# Patient Record
Sex: FEMALE | Race: WHITE
[De-identification: names, ages, dates, MRNs, and addresses within clinical notes are randomized per-mention and may not be internally consistent; named-entity substitution may affect disease eponyms.]

---

## 2020-10-30 ENCOUNTER — HOSPITAL ENCOUNTER (EMERGENCY)
Dept: HOSPITAL 62 - ER | Age: 25
Discharge: HOME | End: 2020-10-30
Payer: COMMERCIAL

## 2020-10-30 VITALS — DIASTOLIC BLOOD PRESSURE: 79 MMHG | SYSTOLIC BLOOD PRESSURE: 129 MMHG
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
## 2020-10-30 DIAGNOSIS — S01.511A: ICD-10-CM

## 2020-10-30 DIAGNOSIS — S01.81XA: Primary | ICD-10-CM
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
## 2020-10-30 PROCEDURE — 99283 EMERGENCY DEPT VISIT LOW MDM: CPT

## 2020-10-30 PROCEDURE — 12013 RPR F/E/E/N/L/M 2.6-5.0 CM: CPT

## 2020-10-30 NOTE — ER DOCUMENT REPORT
HPI





- HPI


Patient complains to provider of: lip and chin laceration


Time Seen by Provider: 10/30/20 20:58


Pain Level: 2


Notes: 





25-year-old female to the emergency department with complaints of a laceration 

to her chin and to her left that occurred just prior to arrival.  She states she

was playing a virtual reality game a and had a face mask on.  She states that 

she was jumping in the game and when she did that she actually jumped into a ta

ble at her home.  She states she struck her face.  She states she did not have 

loss of consciousness.  She states she did not damage any teeth.  She states she

thinks that her teeth did come through a portion of her mouth.  She states that 

she is up-to-date on her tetanus shot.  She states that she has not had any 

nausea or vomiting.  She states that she has not had any dizziness, vision 

changes.  Denies any neck pain, back pain, chest pain, abdominal pain, or 

shortness of breath.





- ROS


Systems Reviewed and Negative: Yes All other systems reviewed and negative





- CONSTITUTIONAL


Constitutional: DENIES: Fever, Chills





- EENT


EENT: DENIES: Sore Throat, Ear Pain, Congestion





- NEURO


Neurology: DENIES: Headache, Weakness





- CARDIOVASCULAR


Cardiovascular: DENIES: Chest pain





- RESPIRATORY


Respiratory: DENIES: Trouble Breathing, Coughing





- GASTROINTESTINAL


Gastrointestinal: DENIES: Abdominal Pain, Nausea, Patient vomiting, Diarrhea





- MUSCULOSKELETAL


Musculoskeletal: DENIES: Extremity pain, Back Pain, Swelling





- DERM


Skin Color: Normal


Skin Problems: Laceration - Laceration to chin and lip





Past Medical History





- General


Information source: Patient





- Social History


Smoking Status: Never Smoker


Frequency of alcohol use: None


Drug Abuse: None


Family History: Reviewed & Not Pertinent





Vertical Provider Document





- CONSTITUTIONAL


Agree With Documented VS: Yes


Exam Limitations: No Limitations


General Appearance: WD/WN, No Apparent Distress





- HEENT


HEENT: Normocephalic, PERRLA


Notes: 





To the chin there is a 2 to 3 cm laceration with some mild oozing of blood.  

There is no step-off or deformity.  There is no contusion to the chin.  

Inspection of the inside the mouth there is a laceration just on the inside of 

midline lower lip.  It appears to have started to come through the front to make

an almost through and through laceration.  However, the cut on the front part of

the lip does not require repair.  The inner laceration requires 1 suture.  Teeth

are all intact.  She has no malocclusion.  There are no chipped teeth.  There is

no movement of the jaw bones or the face with pulling on the teeth.  There is no

jaw pain and no difficulty with opening and closing the mouth.  Airway is 

grossly patent.  There is no raccoon eyes and there is no frazier sign.





- NECK


Neck: Normal Inspection, Supple


Notes: 





Nontender to palpation to the midline cervical spine with no step-off or 

deformity





- RESPIRATORY


Respiratory: Breath Sounds Normal, No Respiratory Distress.  negative: Rales, 

Rhonchi, Wheezing





- CARDIOVASCULAR


Cardiovascular: Regular Rate, Regular Rhythm, No Murmur





- GI/ABDOMEN


Gastrointestinal: Abdomen Soft, Abdomen Non-Tender, No Organomegaly





- BACK


Back: Normal Inspection


Notes: 





No tenderness to palpation to the midline thoracic and lumbar spine with no 

step-off or deformity.  Patient can ambulate in the emergency department without

any difficulty.





- MUSCULOSKELETAL/EXTREMETIES


Musculoskeletal/Extremeties: MAEW, FROM, Non-Tender





- NEURO


Level of Consciousness: Awake, Alert, Appropriate


Notes: 





Cranial nerves II through XII are intact.  No pronator drift.  No leg drift.  No

nystagmus.  Patient is oriented person, place, and time





- DERM


Integumentary: Warm, Dry, Laceration - There is a laceration to the chin and 

inside the mouth.  Please see JULIAN for further discussion of the lacerations.





Course





- Re-evaluation


Re-evalutation: 





Impression: Fall, laceration to the chin, laceration to the lip.  Repaired the 

laceration to the chin with ease and also repaired inside oromucosa deficits to 

the lip with 1 Vicryl stitch.  The area where the teeth started to go through on

the outside of the lip does not require suturing.  However because she did come 

through nearly the lower lip we will go ahead and cover with antibiotics.  She 

agrees with the plan.  Encouraged to return if worse.  Suture removal for her 

chin in 5 days








- Vital Signs


Vital signs: 


                                        











Temp Pulse Resp BP Pulse Ox


 


 98.1 F   100   16   140/90 H  99 


 


 10/30/20 20:55  10/30/20 20:55  10/30/20 20:55  10/30/20 20:55  10/30/20 20:55














Procedures





- Laceration/Wound Repair


  ** Face


Time completed: 21:50


Wound length (cm): 3


Wound's Depth, Shape: Superficial, Other - there is a laceration on the inside 

of the lip that appears to have gone through the lip and creating a small 

opening on the outside of the lip.  Inner laceration requires repair, outside 

does not.  Does not involve the vermillion border.  to the chin, there is a 

second laceration.  THis does NOT go through into the oral cavity


Laceration pre-procedure: Sterile PPE Delaney mcdonald applied


Anesthetic type: 1% Lidocaine


Volume Anesthetic (mLs): 2


Wound explored: Clean, No foreign body removed


Irrigated w/ Saline (mLs): 50


Wound Debrided: Minimal


Wound Repaired With: Sutures


Suture Size/Type: Other - four 4-0 nylon on the outside chin, one 6-0 Vicryl in 

the inner lip


Number of Sutures: 5


Post-procedure NV exam normal: Yes


Complications: No





Discharge





- Discharge


Clinical Impression: 


Lip laceration


Qualifiers:


 Encounter type: initial encounter Qualified Code(s): S01.511A - Laceration 

without foreign body of lip, initial encounter





Laceration of chin


Qualifiers:


 Encounter type: initial encounter Qualified Code(s): S01.81XA - Laceration 

without foreign body of other part of head, initial encounter





Condition: Stable


Disposition: HOME, SELF-CARE


Instructions:  Facial Laceration ([facility]), Laceration Care ([facility]), Oral Laceration, 

Sutured ([facility]), Prophylactic Antibiotic ([facility])


Additional Instructions: 


Suture removal in 5 days.  Complete all antibiotics.  Return if worse. Clean 

wound twice a day with warm, soapy water on the outside of chin.  Clean out the 

mouth after eating with warm water.  


Prescriptions: 


Amoxicillin/Potassium Clav [Augmentin 875-125 Tablet] 1 tab PO NOW #20 tablet


Ibuprofen [Motrin 800 mg Tablet] 800 mg PO Q8H PRN #30 tab


 PRN Reason: 


Referrals: 


[facility] [Provider Group] - 11/03/20 (Follow up with clinic for 

suture removal)

## 2020-11-04 ENCOUNTER — HOSPITAL ENCOUNTER (EMERGENCY)
Dept: HOSPITAL 62 - ER | Age: 25
Discharge: HOME | End: 2020-11-04
Payer: COMMERCIAL

## 2020-11-04 VITALS — SYSTOLIC BLOOD PRESSURE: 133 MMHG | DIASTOLIC BLOOD PRESSURE: 83 MMHG
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
## 2020-11-04 DIAGNOSIS — S01.81XD: Primary | ICD-10-CM

## 2020-11-04 NOTE — ER DOCUMENT REPORT
HPI





- HPI


Patient complains to provider of: Suture removal


Time Seen by Provider: 11/04/20 12:05


Onset: Other - 5 days ago


Onset/Duration: Better


Quality of pain: No pain


Context: 





Patient presents for suture removal to chin laceration.  Patient denies any 

complications with the injury.


Associated Symptoms: None


Exacerbated by: Denies


Relieved by: Denies


Similar symptoms previously: No


Recently seen / treated by doctor: Yes





- ROS


ROS below otherwise negative: Yes


Systems Reviewed and Negative: Yes All other systems reviewed and negative





- CONSTITUTIONAL


Constitutional: DENIES: Fever





- REPRODUCTIVE


Reproductive: DENIES: Pregnant:





- DERM


Skin Color: Normal


Skin Problems: Laceration - Sutured laceration to face





Past Medical History





- General


Information source: Patient





- Social History


Smoking Status: Never Smoker


Occupation: Nurse


Family History: Reviewed & Not Pertinent





- Medical History


Medical History: Negative


Surgical Hx: Negative





- Immunizations


Immunizations up to date: Yes





Vertical Provider Document





- CONSTITUTIONAL


Agree With Documented VS: Yes


Exam Limitations: No Limitations


General Appearance: WD/WN, No Apparent Distress





- HEENT


HEENT: Normocephalic


Notes: 





4 intact sutures to chin laceration, wound edges approximated, no surrounding 

erythema





- NECK


Neck: Normal Inspection





- RESPIRATORY


Respiratory: Breath Sounds Normal, No Respiratory Distress





- CARDIOVASCULAR


Cardiovascular: Regular Rate, Regular Rhythm





- MUSCULOSKELETAL/EXTREMETIES


Musculoskeletal/Extremeties: MAEW





- NEURO


Level of Consciousness: Awake, Alert, Appropriate


Motor/Sensory: No Motor Deficit





- DERM


Integumentary: Warm, Dry, Laceration - sutured lac to chin, intact sutures, no 

surrounding erythema, wound edges approximated





Discharge





- Discharge


Clinical Impression: 


 Encounter for removal of sutures





Condition: Stable


Disposition: HOME, SELF-CARE


Instructions:  Suture Removal


Additional Instructions: 


Return immediately for any new or worsening symptoms





Followup with your primary care provider, call tomorrow to make a followup 

appointment





Apply sunblock to the area





Follow-up with plastic surgery for any cosmetic concerns about appearance of the

injury


Referrals: 


JEET LEONG MD [ACTIVE STAFF] - Follow up as needed